# Patient Record
Sex: MALE | URBAN - METROPOLITAN AREA
[De-identification: names, ages, dates, MRNs, and addresses within clinical notes are randomized per-mention and may not be internally consistent; named-entity substitution may affect disease eponyms.]

---

## 2022-07-14 ENCOUNTER — OFFICE VISIT (OUTPATIENT)
Dept: URBAN - METROPOLITAN AREA CLINIC 12 | Facility: CLINIC | Age: 22
End: 2022-07-14
Payer: COMMERCIAL

## 2022-07-14 VITALS
TEMPERATURE: 98 F | BODY MASS INDEX: 31.1 KG/M2 | DIASTOLIC BLOOD PRESSURE: 88 MMHG | HEIGHT: 69 IN | HEART RATE: 66 BPM | SYSTOLIC BLOOD PRESSURE: 152 MMHG | WEIGHT: 210 LBS

## 2022-07-14 DIAGNOSIS — R11.2 NAUSEA & VOMITING: ICD-10-CM

## 2022-07-14 DIAGNOSIS — R10.13 EPIGASTRIC ABDOMINAL PAIN: ICD-10-CM

## 2022-07-14 PROCEDURE — 99204 OFFICE O/P NEW MOD 45 MIN: CPT | Performed by: INTERNAL MEDICINE

## 2022-07-14 RX ORDER — PANTOPRAZOLE SODIUM 40 MG/1
1 TABLET TABLET, DELAYED RELEASE ORAL ONCE A DAY
Qty: 30 | OUTPATIENT
Start: 2022-07-14

## 2022-07-14 RX ORDER — HYOSCYAMINE SULFATE 0.12 MG/1
1 TABLET UNDER THE TONGUE AND ALLOW TO DISSOLVE  AS NEEDED TABLET, ORALLY DISINTEGRATING ORAL THREE TIMES A DAY
Qty: 30 | Refills: 2 | OUTPATIENT
Start: 2022-07-14

## 2022-07-14 NOTE — HPI-TODAY'S VISIT:
22 years old male presented today for evaluation of recurrent nausea vomiting, patient reported he had episode of epigastric pain nausea vomiting for the last 6 months.  The symptoms is cyclic and episodic he was in [place] last month he has episode of the symptoms he was seen by a physician there he had lab which was unremarkable he had ultrasound per his report but there was significant gas in the stomach limiting the exam.  He had occasional abdominal pain associated with the symptoms.  No weight loss he smokes marijuana no NSAID use.

## 2022-07-15 LAB
A/G RATIO: 1.8
ALBUMIN: 5.3
ALKALINE PHOSPHATASE: 83
AST (SGOT): 25
BASO (ABSOLUTE): 0.1
BASOS: 1
BILIRUBIN, TOTAL: 1.8
BUN/CREATININE RATIO: 14
BUN: 15
C-REACTIVE PROTEIN, QUANT: 3
CALCIUM: 10.5
CARBON DIOXIDE, TOTAL: 25
CHLORIDE: 100
CREATININE: 1.1
DEAMIDATED GLIADIN ABS, IGA: 3
DEAMIDATED GLIADIN ABS, IGG: 2
EGFR: 99
ENDOMYSIAL ANTIBODY IGA: NEGATIVE
EOS (ABSOLUTE): 0.3
EOS: 3
GLOBULIN, TOTAL: 2.9
GLUCOSE: 84
H PYLORI BREATH TEST: NEGATIVE
HEMATOCRIT: 51.5
HEMATOLOGY COMMENTS:: (no result)
HEMOGLOBIN: 16.8
IMMATURE CELLS: (no result)
IMMATURE GRANS (ABS): 0
IMMATURE GRANULOCYTES: 0
IMMUNOGLOBULIN A, QN, SERUM: 137
LYMPHS (ABSOLUTE): 3.4
LYMPHS: 32
MCH: 29.7
MCHC: 32.6
MCV: 91
MONOCYTES(ABSOLUTE): 0.7
MONOCYTES: 7
NEUTROPHILS (ABSOLUTE): 6.4
NEUTROPHILS: 57
NRBC: (no result)
PLATELETS: 334
POTASSIUM: 4.3
PROTEIN, TOTAL: 8.2
RBC: 5.66
RDW: 12.7
SODIUM: 143
T-TRANSGLUTAMINASE (TTG) IGA: <2
T-TRANSGLUTAMINASE (TTG) IGG: <2
WBC: 10.9

## 2022-07-18 ENCOUNTER — WEB ENCOUNTER (OUTPATIENT)
Dept: URBAN - METROPOLITAN AREA CLINIC 12 | Facility: CLINIC | Age: 22
End: 2022-07-18

## 2022-07-18 ENCOUNTER — WEB ENCOUNTER (OUTPATIENT)
Dept: URBAN - METROPOLITAN AREA CLINIC 23 | Facility: CLINIC | Age: 22
End: 2022-07-18

## 2022-07-20 ENCOUNTER — LAB OUTSIDE AN ENCOUNTER (OUTPATIENT)
Dept: URBAN - METROPOLITAN AREA CLINIC 23 | Facility: CLINIC | Age: 22
End: 2022-07-20

## 2022-07-21 ENCOUNTER — WEB ENCOUNTER (OUTPATIENT)
Dept: URBAN - METROPOLITAN AREA CLINIC 12 | Facility: CLINIC | Age: 22
End: 2022-07-21

## 2022-07-21 ENCOUNTER — WEB ENCOUNTER (OUTPATIENT)
Dept: URBAN - METROPOLITAN AREA CLINIC 23 | Facility: CLINIC | Age: 22
End: 2022-07-21

## 2022-07-22 ENCOUNTER — WEB ENCOUNTER (OUTPATIENT)
Dept: URBAN - METROPOLITAN AREA CLINIC 12 | Facility: CLINIC | Age: 22
End: 2022-07-22

## 2022-08-08 ENCOUNTER — WEB ENCOUNTER (OUTPATIENT)
Dept: URBAN - METROPOLITAN AREA CLINIC 12 | Facility: CLINIC | Age: 22
End: 2022-08-08

## 2022-08-08 ENCOUNTER — TELEPHONE ENCOUNTER (OUTPATIENT)
Dept: URBAN - METROPOLITAN AREA CLINIC 23 | Facility: CLINIC | Age: 22
End: 2022-08-08

## 2022-08-08 RX ORDER — ONDANSETRON HYDROCHLORIDE 4 MG/1
1 TABLET TABLET, FILM COATED ORAL EVERY 6 HOURS
Qty: 120 TABLET | Refills: 2 | OUTPATIENT
Start: 2022-08-09

## 2022-08-08 RX ORDER — HYOSCYAMINE SULFATE 0.12 MG/1
1 TABLET AS NEEDED TABLET ORAL
Qty: 540 TABLET | Refills: 2 | OUTPATIENT
Start: 2022-08-09 | End: 2023-05-06

## 2022-08-08 RX ORDER — PANTOPRAZOLE SODIUM 40 MG/1
1 TABLET TABLET, DELAYED RELEASE ORAL ONCE A DAY
Qty: 90 | Refills: 3
Start: 2022-08-08

## 2022-09-27 ENCOUNTER — WEB ENCOUNTER (OUTPATIENT)
Dept: URBAN - METROPOLITAN AREA CLINIC 12 | Facility: CLINIC | Age: 22
End: 2022-09-27

## 2022-09-27 RX ORDER — HYOSCYAMINE SULFATE 0.12 MG/1
1 TABLET UNDER THE TONGUE AND ALLOW TO DISSOLVE  AS NEEDED TABLET, ORALLY DISINTEGRATING ORAL THREE TIMES A DAY
Qty: 120 | Refills: 6
Start: 2022-07-14 | End: 2023-04-25

## 2022-12-08 ENCOUNTER — WEB ENCOUNTER (OUTPATIENT)
Dept: URBAN - METROPOLITAN AREA CLINIC 12 | Facility: CLINIC | Age: 22
End: 2022-12-08

## 2022-12-09 ENCOUNTER — WEB ENCOUNTER (OUTPATIENT)
Dept: URBAN - METROPOLITAN AREA CLINIC 12 | Facility: CLINIC | Age: 22
End: 2022-12-09

## 2023-12-13 ENCOUNTER — OFFICE VISIT (OUTPATIENT)
Dept: URBAN - METROPOLITAN AREA CLINIC 98 | Facility: CLINIC | Age: 23
End: 2023-12-13

## 2024-02-02 ENCOUNTER — OV EP (OUTPATIENT)
Dept: URBAN - METROPOLITAN AREA CLINIC 98 | Facility: CLINIC | Age: 24
End: 2024-02-02
Payer: COMMERCIAL

## 2024-02-02 VITALS
DIASTOLIC BLOOD PRESSURE: 81 MMHG | WEIGHT: 211 LBS | HEART RATE: 98 BPM | BODY MASS INDEX: 31.25 KG/M2 | HEIGHT: 69 IN | TEMPERATURE: 97.3 F | SYSTOLIC BLOOD PRESSURE: 144 MMHG

## 2024-02-02 DIAGNOSIS — K58.0 IRRITABLE BOWEL SYNDROME WITH DIARRHEA: ICD-10-CM

## 2024-02-02 DIAGNOSIS — R14.3 FLATULENCE: ICD-10-CM

## 2024-02-02 DIAGNOSIS — R11.14 BILIOUS VOMITING WITH NAUSEA: ICD-10-CM

## 2024-02-02 DIAGNOSIS — R11.0 NAUSEA: ICD-10-CM

## 2024-02-02 PROBLEM — 197125005: Status: ACTIVE | Noted: 2024-02-02

## 2024-02-02 PROCEDURE — 99214 OFFICE O/P EST MOD 30 MIN: CPT | Performed by: INTERNAL MEDICINE

## 2024-02-02 RX ORDER — ONDANSETRON HYDROCHLORIDE 4 MG/1
1 TABLET TABLET, FILM COATED ORAL EVERY 6 HOURS
Qty: 120 TABLET | Refills: 2 | Status: ACTIVE | COMMUNITY
Start: 2022-08-09

## 2024-02-02 RX ORDER — PANTOPRAZOLE SODIUM 40 MG/1
1 TABLET TABLET, DELAYED RELEASE ORAL ONCE A DAY
Qty: 90 | Refills: 3 | Status: ACTIVE | COMMUNITY
Start: 2022-08-08

## 2024-02-02 RX ORDER — RIFAXIMIN 550 MG/1
1 TABLET TABLET ORAL THREE TIMES A DAY
Qty: 42 TABLET | Refills: 0 | OUTPATIENT
Start: 2024-02-02 | End: 2024-02-16

## 2024-02-02 NOTE — HPI-TODAY'S VISIT:
22 years old male presented today for evaluation of recurrent nausea vomiting, patient reported he had episode of epigastric pain nausea vomiting for the last 6 months.  The symptoms is cyclic and episodic he was in [place] last month he has episode of the symptoms he was seen by a physician there he had lab which was unremarkable he had ultrasound per his report but there was significant gas in the stomach limiting the exam.  He had occasional abdominal pain associated with the symptoms.  No weight loss he smokes marijuana no NSAID use.  Present - more gas and flatulence for last 2 months.  - no CIBH or bleeding.  - more bloating as well.  - occasional reflux as well.  - on pantoprazole in AM and famotidine at night - takes as needed. Maybe once a week. - resolved nausea issues.  - uses sparkling water.  - uses marijuana - couple of times a week